# Patient Record
Sex: MALE | Race: WHITE | NOT HISPANIC OR LATINO | URBAN - METROPOLITAN AREA
[De-identification: names, ages, dates, MRNs, and addresses within clinical notes are randomized per-mention and may not be internally consistent; named-entity substitution may affect disease eponyms.]

---

## 2021-12-28 ENCOUNTER — OFFICE VISIT (OUTPATIENT)
Dept: URBAN - METROPOLITAN AREA CLINIC 121 | Facility: CLINIC | Age: 56
End: 2021-12-28

## 2022-05-04 ENCOUNTER — OFFICE VISIT (OUTPATIENT)
Dept: URBAN - METROPOLITAN AREA CLINIC 63 | Facility: CLINIC | Age: 57
End: 2022-05-04

## 2022-05-18 ENCOUNTER — OFFICE VISIT (OUTPATIENT)
Dept: URBAN - METROPOLITAN AREA CLINIC 63 | Facility: CLINIC | Age: 57
End: 2022-05-18

## 2022-06-01 ENCOUNTER — OFFICE VISIT (OUTPATIENT)
Dept: URBAN - METROPOLITAN AREA SURGERY CENTER 4 | Facility: SURGERY CENTER | Age: 57
End: 2022-06-01

## 2022-06-27 ENCOUNTER — OFFICE VISIT (OUTPATIENT)
Dept: URBAN - METROPOLITAN AREA CLINIC 63 | Facility: CLINIC | Age: 57
End: 2022-06-27

## 2022-07-09 ENCOUNTER — TELEPHONE ENCOUNTER (OUTPATIENT)
Dept: URBAN - METROPOLITAN AREA CLINIC 121 | Facility: CLINIC | Age: 57
End: 2022-07-09

## 2022-07-09 RX ORDER — DICYCLOMINE HYDROCHLORIDE 20 MG/1
TABLET ORAL
Refills: 0 | OUTPATIENT
Start: 2022-04-20 | End: 2022-05-04

## 2022-07-09 RX ORDER — ASPIRIN 81 MG/1
TABLET, COATED ORAL ONCE A DAY
Refills: 0 | OUTPATIENT
Start: 2015-12-21 | End: 2016-02-18

## 2022-07-09 RX ORDER — HYDROCODONE BITARTRATE AND ACETAMINOPHEN 325; 10 MG/1; MG/1
TABLET ORAL
Refills: 0 | OUTPATIENT
Start: 2015-12-21 | End: 2016-02-18

## 2022-07-09 RX ORDER — INSULIN GLARGINE 100 [IU]/ML
INJECTION, SOLUTION SUBCUTANEOUS ONCE A DAY
Refills: 0 | OUTPATIENT
Start: 2015-12-21 | End: 2016-02-18

## 2022-07-09 RX ORDER — METFORMIN HCL 1000 MG/1
TABLET ORAL ONCE A DAY
Refills: 0 | OUTPATIENT
Start: 2015-12-21 | End: 2016-02-18

## 2022-07-09 RX ORDER — METFORMIN HCL 500 MG/1
TABLET ORAL ONCE A DAY
Refills: 0 | OUTPATIENT
Start: 2015-12-21 | End: 2016-02-18

## 2022-07-09 RX ORDER — FAMOTIDINE 20 MG/1
TABLET ORAL
Refills: 0 | OUTPATIENT
Start: 2022-04-13 | End: 2022-05-04

## 2022-07-09 RX ORDER — SITAGLIPTIN PHOSPHATE 100 MG
TABLET ORAL ONCE A DAY
Refills: 0 | OUTPATIENT
Start: 2015-12-21 | End: 2016-02-18

## 2022-07-09 RX ORDER — PREGABALIN 150 MG
CAPSULE ORAL TWICE A DAY
Refills: 0 | OUTPATIENT
Start: 2015-12-21 | End: 2016-02-18

## 2022-07-10 ENCOUNTER — TELEPHONE ENCOUNTER (OUTPATIENT)
Dept: URBAN - METROPOLITAN AREA CLINIC 121 | Facility: CLINIC | Age: 57
End: 2022-07-10

## 2022-07-10 RX ORDER — ASPIRIN 81 MG/1
TABLET, COATED ORAL ONCE A DAY
Refills: 2 | Status: ACTIVE | COMMUNITY
Start: 2016-02-18

## 2022-07-10 RX ORDER — ROSUVASTATIN CALCIUM 20 MG/1
TABLET, FILM COATED ORAL ONCE A DAY
Refills: 0 | Status: ACTIVE | COMMUNITY
Start: 2022-05-04

## 2022-07-10 RX ORDER — PREGABALIN 150 MG
CAPSULE ORAL TWICE A DAY
Refills: 2 | Status: ACTIVE | COMMUNITY
Start: 2016-02-18

## 2022-07-10 RX ORDER — METFORMIN HCL 1000 MG/1
TABLET ORAL ONCE A DAY
Refills: 0 | Status: ACTIVE | COMMUNITY
Start: 2022-05-04

## 2022-07-10 RX ORDER — HYDROCODONE BITARTRATE AND ACETAMINOPHEN 325; 10 MG/1; MG/1
TABLET ORAL
Refills: 2 | Status: ACTIVE | COMMUNITY
Start: 2016-02-18

## 2022-07-10 RX ORDER — DICYCLOMINE HYDROCHLORIDE 20 MG/1
TABLET ORAL AS NEEDED
Refills: 0 | Status: ACTIVE | COMMUNITY
Start: 2022-05-04

## 2022-07-10 RX ORDER — METFORMIN HCL 1000 MG/1
TABLET ORAL ONCE A DAY
Refills: 2 | Status: ACTIVE | COMMUNITY
Start: 2016-02-18

## 2022-07-10 RX ORDER — ONDANSETRON 8 MG/1
UP TO THREE TIMES A DAY, SL, AS NEEDED FOR NAUSEA TABLET, ORALLY DISINTEGRATING ORAL THREE TIMES A DAY
Refills: 1 | Status: ACTIVE | COMMUNITY
Start: 2022-05-04

## 2022-07-10 RX ORDER — LISINOPRIL 10 MG/1
TABLET ORAL ONCE A DAY
Refills: 0 | Status: ACTIVE | COMMUNITY
Start: 2022-05-04

## 2022-07-10 RX ORDER — FAMOTIDINE 20 MG/1
TABLET ORAL ONCE A DAY
Refills: 0 | Status: ACTIVE | COMMUNITY
Start: 2022-05-04

## 2022-07-10 RX ORDER — INSULIN GLARGINE 100 [IU]/ML
INJECTION, SOLUTION SUBCUTANEOUS ONCE A DAY
Refills: 2 | Status: ACTIVE | COMMUNITY
Start: 2016-02-18

## 2022-07-10 RX ORDER — FLUOXETINE HYDROCHLORIDE 40 MG/1
CAPSULE ORAL ONCE A DAY
Refills: 0 | Status: ACTIVE | COMMUNITY
Start: 2022-05-04

## 2022-07-10 RX ORDER — ASPIRIN 81 MG/1
TABLET, FILM COATED ORAL ONCE A DAY
Refills: 0 | Status: ACTIVE | COMMUNITY
Start: 2022-05-04

## 2022-07-10 RX ORDER — METFORMIN HCL 500 MG/1
TABLET ORAL ONCE A DAY
Refills: 2 | Status: ACTIVE | COMMUNITY
Start: 2016-02-18

## 2022-07-10 RX ORDER — SITAGLIPTIN PHOSPHATE 100 MG
TABLET ORAL ONCE A DAY
Refills: 2 | Status: ACTIVE | COMMUNITY
Start: 2016-02-18

## 2022-08-31 ENCOUNTER — TELEPHONE ENCOUNTER (OUTPATIENT)
Dept: URBAN - METROPOLITAN AREA CLINIC 63 | Facility: CLINIC | Age: 57
End: 2022-08-31

## 2022-12-02 ENCOUNTER — WEB ENCOUNTER (OUTPATIENT)
Dept: URBAN - METROPOLITAN AREA CLINIC 63 | Facility: CLINIC | Age: 57
End: 2022-12-02

## 2022-12-02 ENCOUNTER — LAB OUTSIDE AN ENCOUNTER (OUTPATIENT)
Dept: URBAN - METROPOLITAN AREA CLINIC 63 | Facility: CLINIC | Age: 57
End: 2022-12-02

## 2022-12-02 ENCOUNTER — OFFICE VISIT (OUTPATIENT)
Dept: URBAN - METROPOLITAN AREA CLINIC 63 | Facility: CLINIC | Age: 57
End: 2022-12-02

## 2022-12-02 VITALS
BODY MASS INDEX: 34.46 KG/M2 | HEIGHT: 73 IN | WEIGHT: 260 LBS | TEMPERATURE: 96.2 F | DIASTOLIC BLOOD PRESSURE: 84 MMHG | OXYGEN SATURATION: 97 % | HEART RATE: 86 BPM | SYSTOLIC BLOOD PRESSURE: 122 MMHG

## 2022-12-02 DIAGNOSIS — Z86.010 PERSONAL HISTORY OF COLONIC POLYPS: ICD-10-CM

## 2022-12-02 DIAGNOSIS — R13.19 ESOPHAGEAL DYSPHAGIA: ICD-10-CM

## 2022-12-02 DIAGNOSIS — R11.2 NAUSEA AND VOMITING, UNSPECIFIED VOMITING TYPE: ICD-10-CM

## 2022-12-02 PROCEDURE — 99214 OFFICE O/P EST MOD 30 MIN: CPT | Performed by: NURSE PRACTITIONER

## 2022-12-02 RX ORDER — INSULIN GLARGINE 100 [IU]/ML
INJECTION, SOLUTION SUBCUTANEOUS ONCE A DAY
Refills: 2 | Status: ACTIVE | COMMUNITY
Start: 2016-02-18

## 2022-12-02 RX ORDER — FAMOTIDINE 20 MG/1
TABLET ORAL ONCE A DAY
Refills: 0 | Status: ACTIVE | COMMUNITY
Start: 2022-05-04

## 2022-12-02 RX ORDER — FLUOXETINE HYDROCHLORIDE 40 MG/1
CAPSULE ORAL ONCE A DAY
Refills: 0 | Status: ACTIVE | COMMUNITY
Start: 2022-05-04

## 2022-12-02 RX ORDER — ASPIRIN 81 MG/1
TABLET, COATED ORAL ONCE A DAY
Refills: 2 | Status: ACTIVE | COMMUNITY
Start: 2016-02-18

## 2022-12-02 RX ORDER — PREGABALIN 150 MG
CAPSULE ORAL TWICE A DAY
Refills: 2 | Status: ACTIVE | COMMUNITY
Start: 2016-02-18

## 2022-12-02 RX ORDER — ONDANSETRON HYDROCHLORIDE 4 MG/1
1 TABLET TABLET, FILM COATED ORAL
Qty: 2 | Refills: 0 | OUTPATIENT
Start: 2022-12-02

## 2022-12-02 RX ORDER — METFORMIN HCL 1000 MG/1
TABLET ORAL ONCE A DAY
Refills: 0 | Status: ACTIVE | COMMUNITY
Start: 2022-05-04

## 2022-12-02 RX ORDER — DICYCLOMINE HYDROCHLORIDE 20 MG/1
TABLET ORAL AS NEEDED
Refills: 0 | Status: ACTIVE | COMMUNITY
Start: 2022-05-04

## 2022-12-02 RX ORDER — FAMOTIDINE 20 MG/1
1 TABLET QAM TABLET, FILM COATED ORAL ONCE A DAY
Qty: 90 | Refills: 3 | OUTPATIENT
Start: 2022-12-02

## 2022-12-02 RX ORDER — SITAGLIPTIN PHOSPHATE 100 MG
TABLET ORAL ONCE A DAY
Refills: 2 | Status: ACTIVE | COMMUNITY
Start: 2016-02-18

## 2022-12-02 RX ORDER — ROSUVASTATIN CALCIUM 20 MG/1
TABLET, FILM COATED ORAL ONCE A DAY
Refills: 0 | Status: ACTIVE | COMMUNITY
Start: 2022-05-04

## 2022-12-02 RX ORDER — ONDANSETRON 8 MG/1
UP TO THREE TIMES A DAY, SL, AS NEEDED FOR NAUSEA TABLET, ORALLY DISINTEGRATING ORAL THREE TIMES A DAY
Refills: 1 | Status: ACTIVE | COMMUNITY
Start: 2022-05-04

## 2022-12-02 RX ORDER — LISINOPRIL 10 MG/1
TABLET ORAL ONCE A DAY
Refills: 0 | Status: ACTIVE | COMMUNITY
Start: 2022-05-04

## 2022-12-02 NOTE — HPI-PREVIOUS LABS
Lab work dated 13 April 2022 demonstrates the following abnormalities: Glucose 328, BUN 25, creatinine 1.40, total protein 8.5, eGFR 58.6, urine transparency hazy, urine protein 30, urine glucose >500, urine RBC 11-15, urine WBC 6-10, urine hyaline casts >20.  All remaining lab values of CBC, CMP, lipase and urinalysis are negative or within normal limits.

## 2022-12-02 NOTE — HPI-PREVIOUS IMAGING
Gastric emptying scan/18 May 2022.  Normal gastric emptying study.  (1 hour: 2% - 2 hours: 50% - 3 hours: 96%) ********** CT abdomen and pelvis with contrast/13 April 2022.  No evidence of acute abdominal or pelvic pathology.

## 2022-12-02 NOTE — HPI-TODAY'S VISIT:
Thank you very much for kindly referring Meir Garcia" Segrio, very pleasant 57-year-old male, back to our service due to vomiting, loose stool and for surveillance colonoscopy.  Past medical history significant for bicuspid aortic valve, HTN, DM2, chronic pain syndrome and esophageal diverticulum.  Past surgical history significant for cholecystectomy, shoulder, knee and hand procedures.  His last complete colonoscopy was 04 February 2016 and was significant for angioectasias, hyperplastic polyps and internal hemorrhoids.  He is due for surveillance colonoscopy.  Jaylan presents today complaining of continued, but lessened episodes of vomiting, particularly bile, and occasional episodes of diarrhea but not as significant or on the "8-day cycle" he previously stated at last office visit dated 04 May 2022.  Unfortunately, he is still noncompliant with his diabetes medication.  Additionally, he relates an episode of "food poisoning" approximately 1 month ago but states his symptoms resolved within 24 hours without intervention.  When he does have abdominal discomfort he says, "my stomach feels like it is having spasms".  He complains of occasional food, "getting stuck on the way down" but uses famotidine, 20 mg as needed.  He is otherwise asymptomatic from a GI perspective and relates 2-3 bowel movements per day of loose stool (Mina 4-6) but without blood, melena or unintentional weight loss.  He is currently using ondansetron as needed with good efficacy.

## 2022-12-02 NOTE — HPI-PREVIOUS PROCEDURES
Colonoscopy/04 February 2016.  Two small, localized, angioectasias without bleeding were found in the proximal ascending colon.  Two hyperplastic polyps (6-8 mm) were removed from the distal sigmoid colon.  Internal hemorrhoids present.  All remaining findings are negative or benign.  Recommend repeat colonoscopy in 5 years due to multiple hyperplastic polyps removed on this procedure.

## 2022-12-09 ENCOUNTER — LAB OUTSIDE AN ENCOUNTER (OUTPATIENT)
Dept: URBAN - METROPOLITAN AREA CLINIC 63 | Facility: CLINIC | Age: 57
End: 2022-12-09

## 2022-12-12 ENCOUNTER — TELEPHONE ENCOUNTER (OUTPATIENT)
Dept: URBAN - METROPOLITAN AREA CLINIC 63 | Facility: CLINIC | Age: 57
End: 2022-12-12

## 2022-12-15 PROBLEM — 428283002 HISTORY OF POLYP OF COLON (SITUATION): Status: ACTIVE | Noted: 2022-12-02

## 2022-12-20 ENCOUNTER — TELEPHONE ENCOUNTER (OUTPATIENT)
Dept: URBAN - METROPOLITAN AREA CLINIC 63 | Facility: CLINIC | Age: 57
End: 2022-12-20

## 2022-12-22 ENCOUNTER — OFFICE VISIT (OUTPATIENT)
Dept: URBAN - METROPOLITAN AREA SURGERY CENTER 4 | Facility: SURGERY CENTER | Age: 57
End: 2022-12-22

## 2023-01-13 ENCOUNTER — OFFICE VISIT (OUTPATIENT)
Dept: URBAN - METROPOLITAN AREA CLINIC 63 | Facility: CLINIC | Age: 58
End: 2023-01-13

## 2023-04-17 ENCOUNTER — WEB ENCOUNTER (OUTPATIENT)
Dept: URBAN - METROPOLITAN AREA SURGERY CENTER 4 | Facility: SURGERY CENTER | Age: 58
End: 2023-04-17

## 2023-04-19 ENCOUNTER — CLAIMS CREATED FROM THE CLAIM WINDOW (OUTPATIENT)
Dept: URBAN - METROPOLITAN AREA CLINIC 4 | Facility: CLINIC | Age: 58
End: 2023-04-19

## 2023-04-19 ENCOUNTER — OUT OF OFFICE VISIT (OUTPATIENT)
Dept: URBAN - METROPOLITAN AREA SURGERY CENTER 4 | Facility: SURGERY CENTER | Age: 58
End: 2023-04-19

## 2023-04-19 DIAGNOSIS — R19.7 DIARRHEA: ICD-10-CM

## 2023-04-19 DIAGNOSIS — K55.20 ANGIODYSPLASIA OF COLON WITHOUT HEMORRHAGE: ICD-10-CM

## 2023-04-19 DIAGNOSIS — D12.5 SIGMOID POLYP: ICD-10-CM

## 2023-04-19 DIAGNOSIS — E78.5 BORDERLINE HYPERLIPIDEMIA: ICD-10-CM

## 2023-04-19 DIAGNOSIS — R11.10 VOMITING: ICD-10-CM

## 2023-04-19 DIAGNOSIS — Z86.010 PERSONAL HISTORY OF COLONIC POLYPS: ICD-10-CM

## 2023-04-19 DIAGNOSIS — K22.2 ESOPHAGEAL OBSTRUCTION: ICD-10-CM

## 2023-04-19 DIAGNOSIS — K22.2 ACQUIRED ESOPHAGEAL RING: ICD-10-CM

## 2023-04-19 DIAGNOSIS — K31.89 OTHER DISEASES OF STOMACH AND DUODENUM: ICD-10-CM

## 2023-04-19 DIAGNOSIS — R13.10 DYSPHAGIA: ICD-10-CM

## 2023-04-19 DIAGNOSIS — K62.1 RECTAL POLYP: ICD-10-CM

## 2023-04-19 PROCEDURE — 43450 DILATE ESOPHAGUS 1/MULT PASS: CPT | Performed by: INTERNAL MEDICINE

## 2023-04-19 PROCEDURE — 00320 ANES ALL PX NECK NOS 1YR/>: CPT | Performed by: NURSE ANESTHETIST, CERTIFIED REGISTERED

## 2023-04-19 PROCEDURE — 45380 COLONOSCOPY AND BIOPSY: CPT | Performed by: INTERNAL MEDICINE

## 2023-04-19 PROCEDURE — 45385 COLONOSCOPY W/LESION REMOVAL: CPT | Performed by: INTERNAL MEDICINE

## 2023-04-19 PROCEDURE — 88305 TISSUE EXAM BY PATHOLOGIST: CPT | Performed by: PATHOLOGY

## 2023-04-19 PROCEDURE — 43239 EGD BIOPSY SINGLE/MULTIPLE: CPT | Performed by: INTERNAL MEDICINE

## 2023-04-19 PROCEDURE — 88312 SPECIAL STAINS GROUP 1: CPT | Performed by: PATHOLOGY

## 2023-04-19 RX ORDER — PREGABALIN 150 MG
CAPSULE ORAL TWICE A DAY
Refills: 2 | Status: ACTIVE | COMMUNITY
Start: 2016-02-18

## 2023-04-19 RX ORDER — FLUOXETINE HYDROCHLORIDE 40 MG/1
CAPSULE ORAL ONCE A DAY
Refills: 0 | Status: ACTIVE | COMMUNITY
Start: 2022-05-04

## 2023-04-19 RX ORDER — ONDANSETRON HYDROCHLORIDE 4 MG/1
1 TABLET TABLET, FILM COATED ORAL
Qty: 2 | Refills: 0 | Status: ACTIVE | COMMUNITY
Start: 2022-12-02

## 2023-04-19 RX ORDER — DICYCLOMINE HYDROCHLORIDE 20 MG/1
TABLET ORAL AS NEEDED
Refills: 0 | Status: ACTIVE | COMMUNITY
Start: 2022-05-04

## 2023-04-19 RX ORDER — ASPIRIN 81 MG/1
TABLET, COATED ORAL ONCE A DAY
Refills: 2 | Status: ACTIVE | COMMUNITY
Start: 2016-02-18

## 2023-04-19 RX ORDER — ONDANSETRON 8 MG/1
UP TO THREE TIMES A DAY, SL, AS NEEDED FOR NAUSEA TABLET, ORALLY DISINTEGRATING ORAL THREE TIMES A DAY
Refills: 1 | Status: ACTIVE | COMMUNITY
Start: 2022-05-04

## 2023-04-19 RX ORDER — ROSUVASTATIN CALCIUM 20 MG/1
TABLET, FILM COATED ORAL ONCE A DAY
Refills: 0 | Status: ACTIVE | COMMUNITY
Start: 2022-05-04

## 2023-04-19 RX ORDER — LISINOPRIL 10 MG/1
TABLET ORAL ONCE A DAY
Refills: 0 | Status: ACTIVE | COMMUNITY
Start: 2022-05-04

## 2023-04-19 RX ORDER — FAMOTIDINE 20 MG/1
TABLET ORAL ONCE A DAY
Refills: 0 | Status: ACTIVE | COMMUNITY
Start: 2022-05-04

## 2023-04-19 RX ORDER — INSULIN GLARGINE 100 [IU]/ML
INJECTION, SOLUTION SUBCUTANEOUS ONCE A DAY
Refills: 2 | Status: ACTIVE | COMMUNITY
Start: 2016-02-18

## 2023-04-19 RX ORDER — SITAGLIPTIN PHOSPHATE 100 MG
TABLET ORAL ONCE A DAY
Refills: 2 | Status: ACTIVE | COMMUNITY
Start: 2016-02-18

## 2023-04-19 RX ORDER — FAMOTIDINE 20 MG/1
1 TABLET QAM TABLET, FILM COATED ORAL ONCE A DAY
Qty: 90 | Refills: 3 | Status: ACTIVE | COMMUNITY
Start: 2022-12-02

## 2023-04-19 RX ORDER — METFORMIN HCL 1000 MG/1
TABLET ORAL ONCE A DAY
Refills: 0 | Status: ACTIVE | COMMUNITY
Start: 2022-05-04

## 2023-05-15 ENCOUNTER — OFFICE VISIT (OUTPATIENT)
Dept: URBAN - METROPOLITAN AREA CLINIC 63 | Facility: CLINIC | Age: 58
End: 2023-05-15

## 2023-05-15 ENCOUNTER — DASHBOARD ENCOUNTERS (OUTPATIENT)
Age: 58
End: 2023-05-15

## 2023-05-15 VITALS
OXYGEN SATURATION: 98 % | WEIGHT: 248 LBS | DIASTOLIC BLOOD PRESSURE: 68 MMHG | HEART RATE: 66 BPM | SYSTOLIC BLOOD PRESSURE: 124 MMHG | HEIGHT: 73 IN | TEMPERATURE: 98.8 F | BODY MASS INDEX: 32.87 KG/M2

## 2023-05-15 DIAGNOSIS — D12.8 ADENOMATOUS POLYP OF RECTUM: ICD-10-CM

## 2023-05-15 DIAGNOSIS — K22.2 ESOPHAGEAL STENOSIS: ICD-10-CM

## 2023-05-15 DIAGNOSIS — K21.00 GASTRO-ESOPHAGEAL REFLUX DISEASE WITH ESOPHAGITIS, WITHOUT BLEEDING: ICD-10-CM

## 2023-05-15 DIAGNOSIS — D12.5 ADENOMATOUS POLYP OF SIGMOID COLON: ICD-10-CM

## 2023-05-15 DIAGNOSIS — K64.1 GRADE II HEMORRHOIDS: ICD-10-CM

## 2023-05-15 PROCEDURE — 99213 OFFICE O/P EST LOW 20 MIN: CPT | Performed by: NURSE PRACTITIONER

## 2023-05-15 RX ORDER — METFORMIN HCL 1000 MG/1
TABLET ORAL ONCE A DAY
Refills: 0 | Status: ACTIVE | COMMUNITY
Start: 2022-05-04

## 2023-05-15 RX ORDER — SITAGLIPTIN PHOSPHATE 100 MG
TABLET ORAL ONCE A DAY
Refills: 2 | Status: ACTIVE | COMMUNITY
Start: 2016-02-18

## 2023-05-15 RX ORDER — FLUOXETINE HYDROCHLORIDE 40 MG/1
CAPSULE ORAL ONCE A DAY
Refills: 0 | Status: ACTIVE | COMMUNITY
Start: 2022-05-04

## 2023-05-15 RX ORDER — ASPIRIN 81 MG/1
TABLET, COATED ORAL ONCE A DAY
Refills: 2 | Status: ACTIVE | COMMUNITY
Start: 2016-02-18

## 2023-05-15 RX ORDER — ROSUVASTATIN CALCIUM 20 MG/1
TABLET, FILM COATED ORAL ONCE A DAY
Refills: 0 | Status: ACTIVE | COMMUNITY
Start: 2022-05-04

## 2023-05-15 RX ORDER — FAMOTIDINE 20 MG/1
1 TABLET QAM TABLET, FILM COATED ORAL ONCE A DAY
Qty: 90 | Refills: 3 | Status: ACTIVE | COMMUNITY
Start: 2022-12-02

## 2023-05-15 RX ORDER — PREGABALIN 150 MG
CAPSULE ORAL TWICE A DAY
Refills: 2 | Status: ACTIVE | COMMUNITY
Start: 2016-02-18

## 2023-05-15 RX ORDER — INSULIN GLARGINE 100 [IU]/ML
INJECTION, SOLUTION SUBCUTANEOUS ONCE A DAY
Refills: 2 | Status: ACTIVE | COMMUNITY
Start: 2016-02-18

## 2023-05-15 RX ORDER — LISINOPRIL 10 MG/1
TABLET ORAL ONCE A DAY
Refills: 0 | Status: ACTIVE | COMMUNITY
Start: 2022-05-04

## 2023-05-15 NOTE — HPI-PREVIOUS PROCEDURES
EGD with dilation/19 April 2023.  Esophageal stenosis dilated with 54 Israeli Huff dilator, encountering moderate resistance.  The dilation site was examined following endoscope reinsertion and showed moderate improvement in luminal narrowing.  Mild gastritis found in the gastric antrum.  Unremarkable duodenum.  Pathology demonstrates reflux type changes in the lower third esophageal biopsy with all remaining findings negative or benign. ********** Colonoscopy/19 April 2023.  Two colonic angioectasias were found in the ascending colon.  7 mm tubular adenomatous polyp removed from the mid sigmoid colon.  6 mm tubular adenomatous polyp removed from the rectum.  Internal hemorrhoids present.  Random biopsies of the ascending colon, descending colon and rectum all demonstrate no significant abnormality.  All remaining findings are negative or benign.  Recommend repeat colonoscopy in 5 years due to multiple adenomatous polyps removed on this procedure. ********** Colonoscopy/04 February 2016.  Two small, localized, angioectasias without bleeding were found in the proximal ascending colon.  Two hyperplastic polyps (6-8 mm) were removed from the distal sigmoid colon.  Internal hemorrhoids present.  All remaining findings are negative or benign.  Recommend repeat colonoscopy in 5 years due to multiple hyperplastic polyps removed on this procedure.

## 2023-05-15 NOTE — HPI-TODAY'S VISIT:
Meir Hill (Don) is a very pleasant 57-year-old male seen in follow-up of EGD with dilation and colonoscopy (see results below).  He admits to tolerating the procedures very easily without any postprocedure complications.  His bowel habits still remain regular with occasional episodes of diarrhea (1-3 days) and states that his swallowing has improved significantly since dilation.  He continues to use famotidine, 40 mg once daily with otherwise good efficacy.

## 2023-05-15 NOTE — HPI-PREVIOUS IMAGING
CT abdomen and pelvis with contrast/27 March 2023.  No findings to account for abdominal pain.  No diverticulosis. ********** CXR PA and lateral/26 March 2023.  No acute disease. ********** Upper GI series/22 February 2023.  Normal study no hiatal hernia or reflux.  No ulcer seen. ********** Gastric emptying scan/18 May 2022.  Normal gastric emptying study.  (1 hour: 2% - 2 hours: 50% - 3 hours: 96%) ********** CT abdomen and pelvis with contrast/13 April 2022.  No evidence of acute abdominal or pelvic pathology.

## 2023-05-15 NOTE — HPI-PREVIOUS LABS
Lab work dated 27 March 2023 demonstrates the following abnormalities: Glucose 131, BUN 22, CO2 31, total protein 8.8.  All remaining lab values of CBC, CMP, PT/INR, high-sensitivity troponin and lipase are within normal limits. ********** Lab work dated 13 April 2022 demonstrates the following abnormalities: Glucose 328, BUN 25, creatinine 1.40, total protein 8.5, eGFR 58.6, urine transparency hazy, urine protein 30, urine glucose >500, urine RBC 11-15, urine WBC 6-10, urine hyaline casts >20.  All remaining lab values of CBC, CMP, lipase and urinalysis are negative or within normal limits.